# Patient Record
(demographics unavailable — no encounter records)

---

## 2024-12-05 NOTE — HISTORY OF PRESENT ILLNESS
[FreeTextEntry1] : 31 year F here for assessment of obesity  Patient is concerned about gradual generalized weight gain.    Follows a special diet: ? follows health  Food Cravings: No Tried to lose weight before: Yes Tried any diet plans/ meal replacements for weight control: No Tried OTC or prescription medication for weight control: No   Activity level: typically inactive with no regular physical activity     Ease of skin bruising: No Proximal muscle weakness: No Prior weight loss surgery: No    Known history of the following:   Thyroid disease: No Heart disease: No Mood disorder: No Hypertension: No Seizures: No Gall bladder disease: No Known diabetic retinopathy: No  Pancreatitis: No

## 2025-03-20 NOTE — PHYSICAL EXAM
[Normal] : normal rate, regular rhythm, normal S1 and S2 and no murmur heard [No Edema] : there was no peripheral edema [Soft] : abdomen soft [Non Tender] : non-tender [No Rash] : no rash [Normal Gait] : normal gait [Normal Affect] : the affect was normal [Normal Mood] : the mood was normal